# Patient Record
Sex: MALE | URBAN - METROPOLITAN AREA
[De-identification: names, ages, dates, MRNs, and addresses within clinical notes are randomized per-mention and may not be internally consistent; named-entity substitution may affect disease eponyms.]

---

## 2019-09-26 ENCOUNTER — OFFICE VISIT (OUTPATIENT)
Dept: URBAN - METROPOLITAN AREA CLINIC 23 | Facility: CLINIC | Age: 69
End: 2019-09-26
Payer: COMMERCIAL

## 2019-09-26 DIAGNOSIS — H25.13 AGE-RELATED NUCLEAR CATARACT, BILATERAL: Primary | ICD-10-CM

## 2019-09-26 DIAGNOSIS — H11.053 PERIPHERAL PTERYGIUM, PROGRESSIVE, BILATERAL: ICD-10-CM

## 2019-09-26 PROCEDURE — 92004 COMPRE OPH EXAM NEW PT 1/>: CPT | Performed by: OPTOMETRIST

## 2019-09-26 ASSESSMENT — INTRAOCULAR PRESSURE
OS: 10
OD: 10

## 2019-09-26 ASSESSMENT — KERATOMETRY
OS: 40.13
OD: 38.50

## 2019-09-26 ASSESSMENT — VISUAL ACUITY
OS: 20/60
OD: 20/50

## 2019-09-26 NOTE — IMPRESSION/PLAN
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 Plan: Discussed diagnosis in detail with patient. Discussed treatment options with patient. Advised patient of condition. Reassured patient of current condition and treatment. Consult recommended [[facility]].

## 2019-11-06 ENCOUNTER — OFFICE VISIT (OUTPATIENT)
Dept: URBAN - METROPOLITAN AREA CLINIC 23 | Facility: CLINIC | Age: 69
End: 2019-11-06
Payer: COMMERCIAL

## 2019-11-06 DIAGNOSIS — H11.013 AMYLOID PTERYGIUM OF EYE, BILATERAL: Primary | ICD-10-CM

## 2019-11-06 PROCEDURE — 92025 CPTRIZED CORNEAL TOPOGRAPHY: CPT | Performed by: OPHTHALMOLOGY

## 2019-11-06 PROCEDURE — 99203 OFFICE O/P NEW LOW 30 MIN: CPT | Performed by: OPHTHALMOLOGY

## 2019-11-06 RX ORDER — OFLOXACIN 3 MG/ML
0.3 % SOLUTION/ DROPS OPHTHALMIC
Qty: 5 | Refills: 1 | Status: ACTIVE
Start: 2019-11-06

## 2019-11-06 RX ORDER — PREDNISOLONE ACETATE 10 MG/ML
1 % SUSPENSION/ DROPS OPHTHALMIC
Qty: 10 | Refills: 1 | Status: ACTIVE
Start: 2019-11-06

## 2019-11-06 ASSESSMENT — INTRAOCULAR PRESSURE
OS: 8
OD: 7

## 2019-11-06 NOTE — IMPRESSION/PLAN
Impression: Amyloid pterygium of eye, bilateral: H11.013. Condition: quality of life issue. Symptoms: may improve with surgery. Plan: Discussed diagnosis in detail with patient. Discussed risks and benefits and patient understands. Discussed risks of progression. Surgical treatment is required. Surgical risks and benefits were discussed, explained and understood by patient. Patient elects to have surgery. Recommend Pterygium excision with graft. RL 2. Patient understands the possibility of regrowth in future and residual scarring following sx and might require subsequent surgery.